# Patient Record
Sex: FEMALE | Race: WHITE | NOT HISPANIC OR LATINO | URBAN - METROPOLITAN AREA
[De-identification: names, ages, dates, MRNs, and addresses within clinical notes are randomized per-mention and may not be internally consistent; named-entity substitution may affect disease eponyms.]

---

## 2019-07-17 ENCOUNTER — IMPORTED ENCOUNTER (OUTPATIENT)
Dept: URBAN - METROPOLITAN AREA CLINIC 31 | Facility: CLINIC | Age: 74
End: 2019-07-17

## 2019-07-17 PROBLEM — E11.9: Noted: 2019-07-17

## 2019-07-17 PROBLEM — Z96.1: Noted: 2019-07-17

## 2019-07-17 PROBLEM — H26.493: Noted: 2019-07-17

## 2019-07-17 PROCEDURE — 92014 COMPRE OPH EXAM EST PT 1/>: CPT

## 2019-07-17 PROCEDURE — 92134 CPTRZ OPH DX IMG PST SGM RTA: CPT

## 2019-07-17 PROCEDURE — 92015 DETERMINE REFRACTIVE STATE: CPT

## 2019-07-17 NOTE — PATIENT DISCUSSION
1.  PCO  OU (Posterior Capsule Opacification)   PCO is visually significant and impairment of vision does not meet the patient’s functional needs or interferes with activities of daily living. Risks benefits and alternatives to the Nd:YAG Laser reviewed including elevated IOP immediately postop and retinal tear/detachment. Patient to notify their ophthalmologist promptly if they have a significant change in symptoms such as flashes of light (photopsia) an increase in floaters loss of visual field or decrease in visual acuity after the procedure. Patient will be scheduled in [facility] for Nd:YAG Laser. OD/OS po w/ BKM. 2. Pseudophakia OU - IOLs stable. Monitor. 3.  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 Stressed the importance of regular followup and good control of BS BP and Lipids to avoid future complications.

## 2019-11-14 ENCOUNTER — IMPORTED ENCOUNTER (OUTPATIENT)
Dept: URBAN - METROPOLITAN AREA CLINIC 31 | Facility: CLINIC | Age: 74
End: 2019-11-14

## 2019-11-14 PROBLEM — Z96.1: Noted: 2019-11-14

## 2019-11-14 PROCEDURE — 99213 OFFICE O/P EST LOW 20 MIN: CPT

## 2019-11-14 NOTE — PATIENT DISCUSSION
Pseudophakia OU - IOLs stable. Monitor. Return for an appointment in 1 year for comprehensive exam. with Dr. Maria Luisa Hunt.

## 2022-04-02 ASSESSMENT — TONOMETRY
OS_IOP_MMHG: 11
OD_IOP_MMHG: 12
OS_IOP_MMHG: 13
OD_IOP_MMHG: 11

## 2022-04-02 ASSESSMENT — VISUAL ACUITY
OD_CC: 20/60-2
OS_CC: 20/60-2
OD_CC: 20/20
OD_PH: SC 20/40
OS_CC: 20/25
OS_PH: SC 20/30 +2

## 2022-04-05 ENCOUNTER — COMPREHENSIVE EXAM (OUTPATIENT)
Dept: URBAN - METROPOLITAN AREA CLINIC 29 | Facility: CLINIC | Age: 77
End: 2022-04-05

## 2022-04-05 DIAGNOSIS — Z96.1: ICD-10-CM

## 2022-04-05 PROCEDURE — 92014 COMPRE OPH EXAM EST PT 1/>: CPT

## 2022-04-05 ASSESSMENT — TONOMETRY
OS_IOP_MMHG: 19
OD_IOP_MMHG: 16

## 2022-04-05 ASSESSMENT — VISUAL ACUITY
OD_SC: J1
OS_SC: 20/25
OS_SC: J1
OD_SC: 20/20

## 2022-04-05 NOTE — PATIENT DISCUSSION
Pseudophakia OU - IOLs stable. Monitor. Return for an appointment in 1 year for comprehensive exam. with Dr. Angy Burgos.

## 2022-07-09 ENCOUNTER — TELEPHONE ENCOUNTER (OUTPATIENT)
Dept: URBAN - METROPOLITAN AREA CLINIC 121 | Facility: CLINIC | Age: 77
End: 2022-07-09

## 2022-07-10 ENCOUNTER — TELEPHONE ENCOUNTER (OUTPATIENT)
Dept: URBAN - METROPOLITAN AREA CLINIC 121 | Facility: CLINIC | Age: 77
End: 2022-07-10

## 2023-05-24 ENCOUNTER — PREPPED CHART (OUTPATIENT)
Dept: URBAN - METROPOLITAN AREA CLINIC 29 | Facility: CLINIC | Age: 78
End: 2023-05-24